# Patient Record
(demographics unavailable — no encounter records)

---

## 2024-12-28 NOTE — PHYSICAL EXAM
[Alert] : alert [Normocephalic] : normocephalic [Flat Open Anterior [place]] : flat open anterior fontanelle [Red Reflex Bilateral] : red reflex bilateral [Normally Placed Ears] : normally placed ears [Auricles Well Formed] : auricles well formed [Clear Tympanic membranes] : clear tympanic membranes [Bony structures visible] : bony structures visible [Nares Patent] : nares patent [Palate Intact] : palate intact [Uvula Midline] : uvula midline [Supple, full passive range of motion] : supple, full passive range of motion [Symmetric Chest Rise] : symmetric chest rise [Clear to Auscultation Bilaterally] : clear to auscultation bilaterally [Regular Rate and Rhythm] : regular rate and rhythm [S1, S2 present] : S1, S2 present [+2 Femoral Pulses] : +2 femoral pulses [Soft] : soft [Bowel Sounds] : bowel sounds present [Umbilical Stump Dry, Clean, Intact] : umbilical stump dry, clean, intact [Normal external genitailia] : normal external genitalia [Central Urethral Opening] : central urethral opening [Testicles Descended Bilaterally] : testicles descended bilaterally [Patent] : patent [Normally Placed] : normally placed [No Abnormal Lymph Nodes Palpated] : no abnormal lymph nodes palpated [Symmetric Flexed Extremities] : symmetric flexed extremities [Startle Reflex] : startle reflex present [Suck Reflex] : suck reflex present [Rooting] : rooting reflex present [Palmar Grasp] : palmar grasp present [Plantar Grasp] : plantar reflex present [Symmetric Esperanza] : symmetric [place] [Acute Distress] : no acute distress [Icteric sclera] : nonicteric sclera [Discharge] : no discharge [Palpable Masses] : no palpable masses [Murmurs] : no murmurs [Tender] : nontender [Distended] : not distended [Hepatomegaly] : no hepatomegaly [Splenomegaly] : no splenomegaly [Burch-Ortolani] : negative Burch-Ortolani [Spinal Dimple] : no spinal dimple [Tuft of Hair] : no tuft of hair [Jaundice] : not jaundice

## 2024-12-28 NOTE — DEVELOPMENTAL MILESTONES
[Normal Development] : Normal Development [Cries with discomfort] : cries with discomfort [Reflexively moves arms and legs] : reflexively moves arms and legs [Grasps reflexively] : grasp reflexively

## 2024-12-28 NOTE — HISTORY OF PRESENT ILLNESS
[Born at ___ Wks Gestation] : The patient was born at [unfilled] weeks gestation [C/S] : via  section [C/S Indication: ____] : ( [unfilled] ) [[place]] : at [facility] [(1) _____] : [unfilled] [(5) _____] : [unfilled] [BW: _____] : weight of [unfilled] [Length: _____] : length of [unfilled] [HC: _____] : head circumference of [unfilled] [DW: _____] : Discharge weight was [unfilled] [Rubella (Immune)] : Rubella immune [Yes] : Yes [Normal] : Normal [___ voids per day] : [unfilled] voids per day [Frequency of stools: ___] : Frequency of stools: [unfilled]  stools [per day] : per day. [In Bassinet/Crib] : sleeps in bassinet/crib [On back] : sleeps on back [Pacifier] : Uses pacifier [Carbon Monoxide Detectors] : Carbon monoxide detectors at home [Smoke Detectors] : Smoke detectors at home. [Hepatitis B Vaccine Given] : Hepatitis B vaccine given [Nirsevimab Given] : Nirsevimab given  [RSV vaccine] : RSV vaccine not received by mother at least 14 days prior to delivery [HepBsAG] : HepBsAg negative [HepC] : Hepatitis C negative [GBS] : GBS negative [VDRL/RPR (Reactive)] : VDRL/RPR nonreactive [] : negative [FreeTextEntry9] : O+ [Co-sleeping] : no co-sleeping [FreeTextEntry7] : [facility] visit [de-identified] : sneezes sometimes  [de-identified] : EBM + Similac formula 2oz every 2-3 hours

## 2024-12-28 NOTE — DISCUSSION/SUMMARY
[Normal Growth] : growth [Normal Development] : developmental [No Elimination Concerns] : elimination [Continue Regimen] : feeding [No Skin Concerns] : skin [Normal Sleep Pattern] : sleep [None] : no known medical problems [Anticipatory Guidance Given] : Anticipatory guidance addressed as per the history of present illness section [ Transition] :  transition [ Care] :  care [Nutritional Adequacy] : nutritional adequacy [Parental Well-Being] : parental well-being [Safety] : safety [Hepatitis B In Hospital] : Hepatitis B administered while in the hospital [No Medications] : ~He/She~ is not on any medications [No Vaccines] : no vaccines needed [Parent/Guardian] : Parent/Guardian [FreeTextEntry1] : 10 day old male born at 39+1 via repeat C/S, here for initial clinic visit. Well appearing  on exam at this time. [facility] NBS pending. Received Hep B and Beyfortus in nursery.  -Age-appropriate anticipatory guidance provided -RTC for next WCC visit/sooner if any concerns arise

## 2025-01-23 NOTE — DISCUSSION/SUMMARY
[Parental Well-Being] : parental well-being [Family Adjustment] : family adjustment [Feeding Routines] : feeding routines [Infant Adjustment] : infant adjustment [Safety] : safety [] : The components of the vaccine(s) to be administered today are listed in the plan of care. The disease(s) for which the vaccine(s) are intended to prevent and the risks have been discussed with the caretaker.  The risks are also included in the appropriate vaccination information statements which have been provided to the patient's caregiver.  The caregiver has given consent to vaccinate. [FreeTextEntry1] :  1 month old for WCC visit. Well appearing infant on exam, no acute medical findings.  -Age-appropriate anticipatory guidance provided -Vaccine as ordered -RTC for next WCC visit/sooner if any concerns arise.

## 2025-01-23 NOTE — PHYSICAL EXAM
[Alert] : alert [Acute Distress] : no acute distress [Normocephalic] : normocephalic [Flat Open Anterior [place]] : flat open anterior fontanelle [PERRL] : PERRL [Red Reflex Bilateral] : red reflex bilateral [Normally Placed Ears] : normally placed ears [Auricles Well Formed] : auricles well formed [Bony landmarks visible] : bony landmarks visible [Discharge] : no discharge [Nares Patent] : nares patent [Palate Intact] : palate intact [Uvula Midline] : uvula midline [Supple, full passive range of motion] : supple, full passive range of motion [Palpable Masses] : no palpable masses [Symmetric Chest Rise] : symmetric chest rise [Clear to Auscultation Bilaterally] : clear to auscultation bilaterally [Regular Rate and Rhythm] : regular rate and rhythm [S1, S2 present] : S1, S2 present [Murmurs] : no murmurs [+2 Femoral Pulses] : +2 femoral pulses [Soft] : soft [Tender] : nontender [Distended] : not distended [Bowel Sounds] : bowel sounds present [Hepatomegaly] : no hepatomegaly [Splenomegaly] : no splenomegaly [Normal external genitailia] : normal external genitalia [Central Urethral Opening] : central urethral opening [Testicles Descended Bilaterally] : testicles descended bilaterally [Normally Placed] : normally placed [No Abnormal Lymph Nodes Palpated] : no abnormal lymph nodes palpated [Burch-Ortolani] : negative Burch-Ortolani [Symmetric Flexed Extremities] : symmetric flexed extremities [Spinal Dimple] : no spinal dimple [Tuft of Hair] : no tuft of hair [Startle Reflex] : startle reflex present [Suck Reflex] : suck reflex present [Rooting] : rooting reflex present [Palmar Grasp] : palmar grasp reflex present [Plantar Grasp] : plantar grasp reflex present [Symmetric [place]] : symmetric [place] [Jaundice] : no jaundice [Rash and/or lesion present] : no rash/lesion

## 2025-01-23 NOTE — DEVELOPMENTAL MILESTONES
[Passed] : passed [Normal Development] : Normal Development [Calms when picked up or spoken to] : calms when picked up or spoken to [Alerts to unexpected sound] : alerts to unexpected sound [Holds chin up in prone] : holds chin up in prone Multiple falls

## 2025-01-23 NOTE — HISTORY OF PRESENT ILLNESS
[Parents] : parents [Normal] : Normal [In Bassinet/Crib] : sleeps in bassinet/crib [On back] : sleeps on back [Co-sleeping] : no co-sleeping [No] : No cigarette smoke exposure [Water heater temperature set at <120 degrees F] : Water heater temperature set at <120 degrees F [Rear facing car seat in back seat] : Rear facing car seat in back seat [Carbon Monoxide Detectors] : Carbon monoxide detectors at home [Smoke Detectors] : Smoke detectors at home. [At risk for exposure to TB] : Not at risk for exposure to Tuberculosis  [de-identified] : Breastmilk ad doug

## 2025-01-31 NOTE — HISTORY OF PRESENT ILLNESS
[de-identified] : Nasal congestion [FreeTextEntry6] : 44 day old male brought in by mother as she is concerned regarding him having mucus in his nose.  She states she uses the bulb suction to clean it, but then notices it coming back. No fevers. No cough, congestion, runny nose, vomiting, diarrhea, rash reported. In usual state of health otherwise. Taking PO well and making multiple voids per day.

## 2025-01-31 NOTE — BEGINNING OF VISIT
[] :  [Mother] : mother [Interpreters_IDNumber] : [id number] [Interpreters_FullName] : Kacy [[id number]] : Pakistani

## 2025-01-31 NOTE — DISCUSSION/SUMMARY
[FreeTextEntry1] : 44 day old male brought in due to concerns regarding mucus build up in nose. Well appearing infant on exam in no acute distress. No congestion noted at time of exam.  -Mother reassured -Call/RTC if any concerns

## 2025-02-17 NOTE — PHYSICAL EXAM
[Alert] : alert [Flat Open Anterior [place]] : flat open anterior fontanelle [PERRL] : PERRL [Red Reflex Bilateral] : red reflex bilateral [Normally Placed Ears] : normally placed ears [Auricles Well Formed] : auricles well formed [Bony landmarks visible] : bony landmarks visible [Nares Patent] : nares patent [Palate Intact] : palate intact [Uvula Midline] : uvula midline [Supple, full passive range of motion] : supple, full passive range of motion [Symmetric Chest Rise] : symmetric chest rise [Clear to Auscultation Bilaterally] : clear to auscultation bilaterally [Regular Rate and Rhythm] : regular rate and rhythm [S1, S2 present] : S1, S2 present [+2 Femoral Pulses] : +2 femoral pulses [Soft] : soft [Bowel Sounds] : bowel sounds present [Normal external genitailia] : normal external genitalia [Central Urethral Opening] : central urethral opening [Testicles Descended Bilaterally] : testicles descended bilaterally [Normally Placed] : normally placed [No Abnormal Lymph Nodes Palpated] : no abnormal lymph nodes palpated [Symmetric Flexed Extremities] : symmetric flexed extremities [Startle Reflex] : startle reflex present [Suck Reflex] : suck reflex present [Rooting] : rooting reflex present [Palmar Grasp] : palmar grasp reflex present [Plantar Grasp] : plantar grasp reflex present [Symmetric [place]] : symmetric [place] [Acute Distress] : no acute distress [Discharge] : no discharge [Palpable Masses] : no palpable masses [Murmurs] : no murmurs [Tender] : nontender [Distended] : not distended [Hepatomegaly] : no hepatomegaly [Splenomegaly] : no splenomegaly [Burch-Ortolani] : negative Burch-Ortolani [Spinal Dimple] : no spinal dimple [Tuft of Hair] : no tuft of hair [Rash and/or lesion present] : no rash/lesion [FreeTextEntry2] : Mild Plagiocephaly

## 2025-02-17 NOTE — HISTORY OF PRESENT ILLNESS
[Normal] : Normal [In Bassinet/Crib] : sleeps in bassinet/crib [On back] : sleeps on back [No] : No cigarette smoke exposure [Water heater temperature set at <120 degrees F] : Water heater temperature set at <120 degrees F [Rear facing car seat in back seat] : Rear facing car seat in back seat [Carbon Monoxide Detectors] : Carbon monoxide detectors at home [Smoke Detectors] : Smoke detectors at home. [Father] : father [Co-sleeping] : no co-sleeping [At risk for exposure to TB] : Not at risk for exposure to Tuberculosis  [FreeTextEntry7] : 2 month C visit  [de-identified] : Breastmilk every 2-3 hours + Similac 1 4oz bottle per day  [de-identified] : Flat head

## 2025-02-17 NOTE — DEVELOPMENTAL MILESTONES
[Smiles responsively] : smiles responsively [Vocalizes with simple cooing] : vocalizes with simple cooing [Lifts head and chest in prone] : lifts head and chest in prone [Passed] : passed

## 2025-02-17 NOTE — DISCUSSION/SUMMARY
[Parental (Maternal) Well-Being] : parental (maternal) well-being [Infant-Family Synchrony] : infant-family synchrony [Nutritional Adequacy] : nutritional adequacy [Infant Behavior] : infant behavior [Safety] : safety [] : The components of the vaccine(s) to be administered today are listed in the plan of care. The disease(s) for which the vaccine(s) are intended to prevent and the risks have been discussed with the caretaker.  The risks are also included in the appropriate vaccination information statements which have been provided to the patient's caregiver.  The caregiver has given consent to vaccinate. [FreeTextEntry1] : 2 month old for WCC visit. Well appearing infant on exam in no acute distress. Age-appropriate anticipatory guidance provided.   -Vaccines as ordered -Plagiocephaly: Mild. Encourage tummy time, repositioning of head. Follow up at next visit  -RTC for next WCC visit/sooner if any concerns arise

## 2025-04-22 NOTE — PHYSICAL EXAM
[Alert] : alert [Normocephalic] : normocephalic [Flat Open Anterior [place]] : flat open anterior fontanelle [Red Reflex] : red reflex bilateral [PERRL] : PERRL [Normally Placed Ears] : normally placed ears [Auricles Well Formed] : auricles well formed [Bony landmarks visible] : bony landmarks visible [Nares Patent] : nares patent [Palate Intact] : palate intact [Uvula Midline] : uvula midline [Symmetric Chest Rise] : symmetric chest rise [Clear to Auscultation Bilaterally] : clear to auscultation bilaterally [Regular Rate and Rhythm] : regular rate and rhythm [S1, S2 present] : S1, S2 present [+2 Femoral Pulses] : (+) 2 femoral pulses [Soft] : soft [Bowel Sounds] : bowel sounds present [Central Urethral Opening] : central urethral opening [Testicles Descended] : testicles descended bilaterally [Patent] : patent [Normally Placed] : normally placed [No Abnormal Lymph Nodes Palpated] : no abnormal lymph nodes palpated [Startle Reflex] : startle reflex present [Plantar Grasp] : plantar grasp reflex present [Symmetric [place]] : symmetric [place] [Acute Distress] : no acute distress [Discharge] : no discharge [Palpable Masses] : no palpable masses [Murmurs] : no murmurs [Tender] : nontender [Distended] : nondistended [Hepatomegaly] : no hepatomegaly [Splenomegaly] : no splenomegaly [Burch-Ortolani] : negative Burch-Ortolani [Allis Sign] : negative Allis sign [Spinal Dimple] : no spinal dimple [Tuft of Hair] : no tuft of hair [de-identified] : (+) Hemangioma <0.5cm over right lower leg. (+) atopic dermatitis over mid chest

## 2025-04-22 NOTE — DISCUSSION/SUMMARY
[Family Functioning] : family functioning [Nutritional Adequacy and Growth] : nutritional adequacy and growth [Infant Development] : infant development [Oral Health] : oral health [Safety] : safety [] : The components of the vaccine(s) to be administered today are listed in the plan of care. The disease(s) for which the vaccine(s) are intended to prevent and the risks have been discussed with the caretaker.  The risks are also included in the appropriate vaccination information statements which have been provided to the patient's caregiver.  The caregiver has given consent to vaccinate. [FreeTextEntry1] : 4 month old for WCC visit. Well appearing infant on exam in no acute distress. Age-appropriate anticipatory guidance provided.   -Vaccines as ordered -Hemangioma of right lower leg: Small, stable. Continue to monitor  -RTC for next WCC visit/sooner if any concerns arise

## 2025-04-22 NOTE — HISTORY OF PRESENT ILLNESS
[Mother] : mother [Normal] : Normal [In Bassinet/Crib] : sleeps in bassinet/crib [On back] : sleeps on back [Sleeps 12-16 hours per 24 hours (including naps)] : sleeps 12-16 hours per 24 hours (including naps) [Tummy time] : tummy time [No] : No cigarette smoke exposure [Water heater temperature set at <120 degrees F] : Water heater temperature set at <120 degrees F [Rear facing car seat in back seat] : Rear facing car seat in back seat [Carbon Monoxide Detectors] : Carbon monoxide detectors at home [Smoke Detectors] : Smoke detectors at home. [Pacifier use] : not using pacifier [FreeTextEntry7] : 4 month C visit  [de-identified] : BM+ Formula ad doug

## 2025-07-16 NOTE — REASON FOR VISIT
[Initial Evaluation] : an initial evaluation [Parents] : parents [FreeTextEntry1] : left clavicle fracture sustained 07/11/25

## 2025-07-16 NOTE — ASSESSMENT
[FreeTextEntry1] : 6 month old M, 4 days out from left clavicle fracture  The history for today's visit was obtained from the parent due to age and therefore the parent was used today as an independent historian. Xray imaging of left clavicle from [facility] was independently reviewed today showing a mildly displaced left clavicle fracture. No signs of interval healing.   Clinically, the patient is doing well. Has not been complaining of discomfort in his left shoulder. Has been moving his left arm freely. The etiology, pathoanatomy, treatment modalities, and expected natural history of the injury were discussed at length today. Clinical exam and imaging findings were discussed at length with the patient and family. Recommendation at this time is follow up in 3 weeks for repeat left clavicle imaging. All concerns were addressed. Family agreed to plan and have no further questions at this time.   F/u in 3 weeks with left clavicle AP    IJuan PA-C, have acted as scribe and documented the above for Dr. Matos for this encounter.

## 2025-07-16 NOTE — ASSESSMENT
[FreeTextEntry1] : 6 month old M, 4 days out from left clavicle fracture  The history for today's visit was obtained from the parent due to age and therefore the parent was used today as an independent historian. Xray imaging of left clavicle from [facility] was independently reviewed today showing a mildly displaced left clavicle fracture. No signs of interval healing.   Clinically, the patient is doing well. Has not been complaining of discomfort in his left shoulder. Has been moving his left arm freely. The etiology, pathoanatomy, treatment modalities, and expected natural history of the injury were discussed at length today. Clinical exam and imaging findings were discussed at length with the patient and family. Recommendation at this time is follow up in 3 weeks for repeat left clavicle imaging. All concerns were addressed. Family agreed to plan and have no further questions at this time.   F/u in 3 weeks with left clavicle AP    IJuan PA-C, have acted as scribe and documented the above for Dr. Matos for this encounter.

## 2025-07-16 NOTE — HISTORY OF PRESENT ILLNESS
[Stable] : stable [FreeTextEntry1] : Patient is a 6-month-old male, otherwise healthy, who is presenting today for a left clavicle fracture sustained 4 days ago DOI: 07/11/25.  As per parents, he rolled off of his bed and landed on his left side injuring his left clavicle.  He immediately had significant pain and discomfort moving his left upper extremity.  The parents took him to [facility] where imaging was remarkable for a left clavicle fracture, displaced.  No intervention was needed and recommended follow-up in our office.  Today, the parents report he has not had any discomfort and has been moving both of his upper extremities normally.  Some slight pain when they try to change his clothing.  Here today for further pediatric orthopedic management.  Birth history is unremarkable. Pregnancy duration was 9 months and he was born vaginally.

## 2025-07-16 NOTE — PHYSICAL EXAM
[FreeTextEntry1] : Patient is AAOx3. Pleasant and cooperative with exam, appropriate for age. NAD and alert.  Skin: The skin is intact, warm, pink and dry over the area examined. No rashes, lesions, or nodules. Eyes: Normal conjunctiva, normal eyelids and pupils were equal and round.  ENT: Normal ears, normal nose, and normal lips. Cardiovascular: Brisk capillary refill. Peripheral pulses intact. No peripheral edema. Respiratory: NAD. Taking full breaths without use of accessory muscles or evidence of audible wheezes or stridor without the use of a stethoscope. Normal respiratory effort.  Abdomen: Not examined.   Left upper extremity exam: Patient is seen actively moving his left arm Is seen reaching and grabbing with left hand TTP over fracture site 5/5 muscle strength  NV intact BCR, Radial pulse 2+

## 2025-07-16 NOTE — DATA REVIEWED
[de-identified] : Xray imaging of left clavicle from [facility] was independently reviewed today showing a mildly displaced left clavicle fracture. No signs of interval healing.

## 2025-07-16 NOTE — END OF VISIT
[FreeTextEntry3] : I, Bhavik Matos MD, I personally performed the services described in the documentation, reviewed the documentation recorded by the scribe in my presence and it accurately and completely records my words and actions

## 2025-07-16 NOTE — HISTORY OF PRESENT ILLNESS
[Stable] : stable [FreeTextEntry1] : Patient is a 6-month-old male, otherwise healthy, who is presenting today for a left clavicle fracture sustained 4 days ago DOI: 07/11/25.  As per parents, he rolled off of his bed and landed on his left side injuring his left clavicle.  He immediately had significant pain and discomfort moving his left upper extremity.  The parents took him to [facility] where imaging was remarkable for a left clavicle fracture, displaced.  No intervention was needed and recommended follow-up in our office.  Today, the parents report he has not had any discomfort and has been moving both of his upper extremities normally.  Some slight pain when they try to change his clothing.  Here today for further pediatric orthopedic management.  Birth history is unremarkable. Pregnancy duration was 9 months and he was born vaginally.

## 2025-07-16 NOTE — DATA REVIEWED
[de-identified] : Xray imaging of left clavicle from [facility] was independently reviewed today showing a mildly displaced left clavicle fracture. No signs of interval healing.

## 2025-07-16 NOTE — REVIEW OF SYSTEMS
[Appropriate Age Development] : development appropriate for age [Nl] : Genitourinary [Change in Activity] : no change in activity [Fever Above 102] : no fever [Wgt Loss (___ Lbs)] : no recent weight loss [Limping] : no limping [Joint Pains] : arthralgias [Joint Swelling] : joint swelling  [Muscle Aches] : no muscle aches

## 2025-07-16 NOTE — DEVELOPMENTAL MILESTONES
[Normal] : Developmental history within normal limits [Not Yet Determined] : not yet determined [FreeTextEntry2] : no [FreeTextEntry3] : no
